# Patient Record
Sex: MALE | Race: WHITE | NOT HISPANIC OR LATINO | Employment: FULL TIME | URBAN - METROPOLITAN AREA
[De-identification: names, ages, dates, MRNs, and addresses within clinical notes are randomized per-mention and may not be internally consistent; named-entity substitution may affect disease eponyms.]

---

## 2024-03-14 ENCOUNTER — HOSPITAL ENCOUNTER (EMERGENCY)
Facility: HOSPITAL | Age: 49
Discharge: HOME | End: 2024-03-14
Attending: EMERGENCY MEDICINE
Payer: MEDICAID

## 2024-03-14 VITALS
DIASTOLIC BLOOD PRESSURE: 111 MMHG | BODY MASS INDEX: 23.86 KG/M2 | SYSTOLIC BLOOD PRESSURE: 140 MMHG | OXYGEN SATURATION: 99 % | HEART RATE: 89 BPM | WEIGHT: 180 LBS | TEMPERATURE: 98 F | RESPIRATION RATE: 16 BRPM | HEIGHT: 73 IN

## 2024-03-14 DIAGNOSIS — F19.10 POLYSUBSTANCE ABUSE: ICD-10-CM

## 2024-03-14 DIAGNOSIS — R45.851 SUICIDAL IDEATIONS: Primary | ICD-10-CM

## 2024-03-14 DIAGNOSIS — F19.10 SUBSTANCE ABUSE: ICD-10-CM

## 2024-03-14 LAB
ALBUMIN SERPL-MCNC: 4 G/DL (ref 3.5–5.2)
ALBUMIN/GLOB SERPL: 1.1 G/DL
ALP SERPL-CCNC: 126 U/L (ref 39–117)
ALT SERPL W P-5'-P-CCNC: 39 U/L (ref 1–41)
AMPHET+METHAMPHET UR QL: POSITIVE
AMPHETAMINES UR QL: POSITIVE
ANION GAP SERPL CALCULATED.3IONS-SCNC: 10 MMOL/L (ref 5–15)
AST SERPL-CCNC: 42 U/L (ref 1–40)
BARBITURATES UR QL SCN: NEGATIVE
BASOPHILS # BLD AUTO: 0.03 10*3/MM3 (ref 0–0.2)
BASOPHILS NFR BLD AUTO: 0.4 % (ref 0–1.5)
BENZODIAZ UR QL SCN: NEGATIVE
BILIRUB SERPL-MCNC: 0.7 MG/DL (ref 0–1.2)
BUN SERPL-MCNC: 9 MG/DL (ref 6–20)
BUN/CREAT SERPL: 12.5 (ref 7–25)
BUPRENORPHINE SERPL-MCNC: POSITIVE NG/ML
CALCIUM SPEC-SCNC: 9 MG/DL (ref 8.6–10.5)
CANNABINOIDS SERPL QL: NEGATIVE
CHLORIDE SERPL-SCNC: 101 MMOL/L (ref 98–107)
CO2 SERPL-SCNC: 29 MMOL/L (ref 22–29)
COCAINE UR QL: NEGATIVE
CREAT SERPL-MCNC: 0.72 MG/DL (ref 0.76–1.27)
DEPRECATED RDW RBC AUTO: 40.8 FL (ref 37–54)
EGFRCR SERPLBLD CKD-EPI 2021: 114.1 ML/MIN/1.73
EOSINOPHIL # BLD AUTO: 0.16 10*3/MM3 (ref 0–0.4)
EOSINOPHIL NFR BLD AUTO: 2.2 % (ref 0.3–6.2)
ERYTHROCYTE [DISTWIDTH] IN BLOOD BY AUTOMATED COUNT: 13.1 % (ref 12.3–15.4)
ETHANOL BLD-MCNC: <10 MG/DL (ref 0–10)
FENTANYL UR-MCNC: POSITIVE NG/ML
GLOBULIN UR ELPH-MCNC: 3.5 GM/DL
GLUCOSE SERPL-MCNC: 123 MG/DL (ref 65–99)
HCT VFR BLD AUTO: 42.3 % (ref 37.5–51)
HGB BLD-MCNC: 13.9 G/DL (ref 13–17.7)
IMM GRANULOCYTES # BLD AUTO: 0.01 10*3/MM3 (ref 0–0.05)
IMM GRANULOCYTES NFR BLD AUTO: 0.1 % (ref 0–0.5)
LYMPHOCYTES # BLD AUTO: 2.06 10*3/MM3 (ref 0.7–3.1)
LYMPHOCYTES NFR BLD AUTO: 28.3 % (ref 19.6–45.3)
MCH RBC QN AUTO: 28.4 PG (ref 26.6–33)
MCHC RBC AUTO-ENTMCNC: 32.9 G/DL (ref 31.5–35.7)
MCV RBC AUTO: 86.5 FL (ref 79–97)
METHADONE UR QL SCN: NEGATIVE
MONOCYTES # BLD AUTO: 0.89 10*3/MM3 (ref 0.1–0.9)
MONOCYTES NFR BLD AUTO: 12.2 % (ref 5–12)
NEUTROPHILS NFR BLD AUTO: 4.13 10*3/MM3 (ref 1.7–7)
NEUTROPHILS NFR BLD AUTO: 56.8 % (ref 42.7–76)
NRBC BLD AUTO-RTO: 0 /100 WBC (ref 0–0.2)
OPIATES UR QL: NEGATIVE
OXYCODONE UR QL SCN: NEGATIVE
PCP UR QL SCN: NEGATIVE
PLATELET # BLD AUTO: 277 10*3/MM3 (ref 140–450)
PMV BLD AUTO: 8.4 FL (ref 6–12)
POTASSIUM SERPL-SCNC: 4.3 MMOL/L (ref 3.5–5.2)
PROT SERPL-MCNC: 7.5 G/DL (ref 6–8.5)
RBC # BLD AUTO: 4.89 10*6/MM3 (ref 4.14–5.8)
SODIUM SERPL-SCNC: 140 MMOL/L (ref 136–145)
TRICYCLICS UR QL SCN: NEGATIVE
WBC NRBC COR # BLD AUTO: 7.28 10*3/MM3 (ref 3.4–10.8)

## 2024-03-14 PROCEDURE — 80053 COMPREHEN METABOLIC PANEL: CPT | Performed by: EMERGENCY MEDICINE

## 2024-03-14 PROCEDURE — 82077 ASSAY SPEC XCP UR&BREATH IA: CPT | Performed by: EMERGENCY MEDICINE

## 2024-03-14 PROCEDURE — 85025 COMPLETE CBC W/AUTO DIFF WBC: CPT | Performed by: EMERGENCY MEDICINE

## 2024-03-14 PROCEDURE — 96375 TX/PRO/DX INJ NEW DRUG ADDON: CPT

## 2024-03-14 PROCEDURE — 25010000002 MIDAZOLAM PER 1 MG: Performed by: EMERGENCY MEDICINE

## 2024-03-14 PROCEDURE — 96374 THER/PROPH/DIAG INJ IV PUSH: CPT

## 2024-03-14 PROCEDURE — 80307 DRUG TEST PRSMV CHEM ANLYZR: CPT | Performed by: EMERGENCY MEDICINE

## 2024-03-14 PROCEDURE — 25010000002 LORAZEPAM PER 2 MG: Performed by: EMERGENCY MEDICINE

## 2024-03-14 PROCEDURE — 99285 EMERGENCY DEPT VISIT HI MDM: CPT

## 2024-03-14 RX ORDER — MIDAZOLAM HYDROCHLORIDE 1 MG/ML
1 INJECTION INTRAMUSCULAR; INTRAVENOUS ONCE
Status: COMPLETED | OUTPATIENT
Start: 2024-03-14 | End: 2024-03-14

## 2024-03-14 RX ORDER — MIDAZOLAM HYDROCHLORIDE 1 MG/ML
2 INJECTION INTRAMUSCULAR; INTRAVENOUS ONCE
Status: DISCONTINUED | OUTPATIENT
Start: 2024-03-14 | End: 2024-03-14

## 2024-03-14 RX ORDER — LORAZEPAM 2 MG/ML
1 INJECTION INTRAMUSCULAR ONCE
Status: COMPLETED | OUTPATIENT
Start: 2024-03-14 | End: 2024-03-14

## 2024-03-14 RX ADMIN — LORAZEPAM 1 MG: 2 INJECTION INTRAMUSCULAR; INTRAVENOUS at 15:50

## 2024-03-14 RX ADMIN — MIDAZOLAM HYDROCHLORIDE 1 MG: 1 INJECTION, SOLUTION INTRAMUSCULAR; INTRAVENOUS at 14:20

## 2024-03-14 NOTE — ED PROVIDER NOTES
"Subjective   History of Present Illness  This patient is a very nice 48-year-old gentleman who comes in to the emergency department on his own volition having walked some distance.  He tells me \"I do not know what to do.\"  Initially triage note indicated the patient had no SI or HI, however one of the nurses here indicated that the patient did share that at least once here since arriving, the patient stated that he plan to hurt or kill himself secondary to \"having nobody left.\"  Patient tells me he has a history of heroin abuse but has been clean for several years.  He tells me he recently lost several family members including his mom.  He tells me his wife kicked him out.  He denies any homicidal ideations.  Denies any A, B, T, or O hallucinations.  Patient reports that he believes he has been \"high\" approximately 4-5 times over the last 8 months.  Denies any recent substance abuse.  Denies any alcohol.  Patient is pleasant here, oriented x 3, somewhat anxious and restless appearing but pleasant.  He does contract for safety.  When asked exactly why he came into the hospital today, he tells me \"I really do not know.\"  When asked him if he is sad or if he thinks about death, he tells me he is both sad and does think about death because he does not have anybody left.    I told the patient we would need to keep him here and that he was not free to go as I was concerned that he was a risk to himself and patient tells me that he understands.    Patient denies any chest pain, headache, vision changes, fever chills or cough.  Patient is resting comfortably and is unaccompanied.    Past medical history  Substance abuse/heroin.  Currently taking Suboxone.  Patient denies hypertension, dyslipidemia, CAD or CVA    Family history  Negative for CAD      Review of Systems   Constitutional: Negative.  Negative for chills, fatigue, fever and unexpected weight change.   HENT:  Negative for dental problem, ear pain, hearing loss and " sinus pressure.    Eyes:  Negative for pain and visual disturbance.   Respiratory:  Negative for chest tightness and shortness of breath.    Cardiovascular:  Negative for chest pain, palpitations and leg swelling.   Gastrointestinal:  Negative for blood in stool, diarrhea, nausea and vomiting.   Genitourinary:  Negative for difficulty urinating, dysuria, frequency, hematuria and urgency.   Musculoskeletal:  Negative for myalgias, neck pain and neck stiffness.   Neurological:  Negative for seizures, syncope, speech difficulty, light-headedness and headaches.   Psychiatric/Behavioral:  Positive for suicidal ideas. Negative for confusion. The patient is nervous/anxious.    All other systems reviewed and are negative.      No past medical history on file.    No Known Allergies    No past surgical history on file.    No family history on file.    Social History     Socioeconomic History    Marital status: Single           Objective   Physical Exam  Vitals and nursing note reviewed.   Constitutional:       General: He is not in acute distress.     Appearance: He is well-developed. He is not toxic-appearing.   HENT:      Head: Normocephalic and atraumatic.      Jaw: No trismus.      Right Ear: External ear normal.      Left Ear: External ear normal.      Nose: Nose normal.      Mouth/Throat:      Mouth: Mucous membranes are moist. Mucous membranes are not dry. No oral lesions.      Dentition: No dental abscesses.      Pharynx: Oropharynx is clear. No posterior oropharyngeal erythema or uvula swelling.      Tonsils: No tonsillar exudate or tonsillar abscesses.   Eyes:      General:         Right eye: No discharge.         Left eye: No discharge.      Extraocular Movements: Extraocular movements intact.      Conjunctiva/sclera: Conjunctivae normal.      Right eye: Right conjunctiva is not injected.      Left eye: Left conjunctiva is not injected.      Pupils: Pupils are equal, round, and reactive to light.   Neck:       Vascular: No JVD.      Trachea: No tracheal tenderness.   Cardiovascular:      Rate and Rhythm: Normal rate and regular rhythm.      Heart sounds: Normal heart sounds.      No friction rub. No gallop.   Pulmonary:      Effort: Pulmonary effort is normal.      Breath sounds: Normal breath sounds. No wheezing or rales.   Chest:      Chest wall: No tenderness.   Abdominal:      General: Bowel sounds are normal. There is no distension.      Palpations: Abdomen is soft. Abdomen is not rigid. There is no mass or pulsatile mass.      Tenderness: There is no abdominal tenderness. There is no guarding or rebound. Negative signs include McBurney's sign.      Comments: No signs of acute abdomen.  No pain at McBurney's point.  No pulsatile abdominal mass.   Musculoskeletal:         General: No tenderness or deformity. Normal range of motion.      Cervical back: Normal range of motion and neck supple. No rigidity. Normal range of motion.   Lymphadenopathy:      Cervical: No cervical adenopathy.   Skin:     General: Skin is warm and dry.      Findings: No erythema or rash.      Comments: No diaphoresis, lesions, nevi, petechia, purpura   Neurological:      Mental Status: He is alert and oriented to person, place, and time.      Cranial Nerves: No cranial nerve deficit.      Sensory: No sensory deficit.      Motor: No tremor or abnormal muscle tone.      Comments: 5/5 strength bilaterally with flexion and extension of fingers, wrist, elbows, knees and hips as well as plantar and dorsiflexion of the foot.   Psychiatric:         Attention and Perception: He is attentive.         Mood and Affect: Mood is anxious.         Speech: Speech normal.         Thought Content: Thought content normal. Thought content is not paranoid or delusional. Thought content includes suicidal ideation. Thought content does not include homicidal or suicidal plan.         Judgment: Judgment normal.      Comments: Patient sad and states that he sometimes  "thinks that he would be better off if he was not here.  No specific plan.  Does not appear to have access to a weapon.  Contract for safety.  No homicidal ideations.         Procedures           ED Course  ED Course as of 03/14/24 1548   Thu Mar 14, 2024   1417 I had a good conversation with the patient.  Although the patient is now stating that he is no longer suicidal, I am concerned about recent loss of family, desponded state, and statements such as \"I just want to be alive.\"  Patient is on a hold at this time.  UDS, alcohol, CBC and CMP ordered.  Will have the [facility] team see the patient once labs are back.  Patient does contract for safety.  He is very appreciative for care at this time he does not appear intoxicated. [ARAM]   1418 Patient denies homicidal ideations.  Denies any auditory, visual, tactile or olfactory hallucinations.  Impression will include suicidal ideations.  Substance abuse.  Final impression and plan following completion of workup. [ARAM]   1449 Labs are starting to result.  Specifically, CBC is back and is unremarkable.  White count is 7.28.  Hemoglobin and hematocrit 13.9 and 42.3 with a platelet count of 277. [ARAM]   1456 Labs are slowly starting to result and look pretty good at this point.  Specifically, CBC shows a white count of 7.28 with hemoglobin and hematocrit 13.9 and 42.3 with a platelet count of 277.  CMP does show elevated alkaline phosphatase, AST of 42, creatinine 0.72 and glucose of 123.  Alcohol level less than 10 and urine drug screen pending. [ARAM]   1535 Patient evidently is now refusing to give a urine study even though he earlier told me he was more than happy to do so.  I am going back to his room to see if he will comply.   coordinator being contacted now. [ARAM]   1543 Versed was given with mild decrease in anxiety but without great effect.  ED pharmacist is going to help us by obtaining 1 of Ativan which I think will help tremendously.  I was called back to the " "bedside as the patient was unwilling to give a urinalysis.  When I went back, I sat nicely next to the patient's bed and he could not have been nicer.  He once again contracted for safety, he apologized for being difficult and told me that he was able to give a urinalysis.  I told the patient that we wanted the  team to see him.  They have apparently 1 or 2 patients in front of him and he understands this.  I told him that if they clear him for discharge, would like colleagues will discharge the patient home.  If the patient is thought to be a threat to himself, patient would likely be in a situation where he either would need to be held against his resistance or he could go willingly for therapy.  Patient tells me he understands and does \"want to get help.\" [ARAM]   1544 Impression includes suicidal ideations, substance abuse.  Urinalysis pending.  Patient is alert and oriented x 3.  He is pleasant.  He does still seem slightly anxious the patient will be evaluated by  shortly. [ARAM]   1546 FORMALLY ADMITTING PT TO OBSERVATION STATUS, PENDING  EVAL AND ULTIMATE DISPOSITION BY THEM. [ARAM]      ED Course User Index  [ARAM] Carlos Pappas MD      Recent Results (from the past 24 hour(s))   Comprehensive Metabolic Panel    Collection Time: 03/14/24  2:22 PM    Specimen: Blood   Result Value Ref Range    Glucose 123 (H) 65 - 99 mg/dL    BUN 9 6 - 20 mg/dL    Creatinine 0.72 (L) 0.76 - 1.27 mg/dL    Sodium 140 136 - 145 mmol/L    Potassium 4.3 3.5 - 5.2 mmol/L    Chloride 101 98 - 107 mmol/L    CO2 29.0 22.0 - 29.0 mmol/L    Calcium 9.0 8.6 - 10.5 mg/dL    Total Protein 7.5 6.0 - 8.5 g/dL    Albumin 4.0 3.5 - 5.2 g/dL    ALT (SGPT) 39 1 - 41 U/L    AST (SGOT) 42 (H) 1 - 40 U/L    Alkaline Phosphatase 126 (H) 39 - 117 U/L    Total Bilirubin 0.7 0.0 - 1.2 mg/dL    Globulin 3.5 gm/dL    A/G Ratio 1.1 g/dL    BUN/Creatinine Ratio 12.5 7.0 - 25.0    Anion Gap 10.0 5.0 - 15.0 mmol/L    eGFR 114.1 >60.0 mL/min/1.73 " "  Ethanol    Collection Time: 03/14/24  2:22 PM    Specimen: Blood   Result Value Ref Range    Ethanol <10 0 - 10 mg/dL   CBC Auto Differential    Collection Time: 03/14/24  2:22 PM    Specimen: Blood   Result Value Ref Range    WBC 7.28 3.40 - 10.80 10*3/mm3    RBC 4.89 4.14 - 5.80 10*6/mm3    Hemoglobin 13.9 13.0 - 17.7 g/dL    Hematocrit 42.3 37.5 - 51.0 %    MCV 86.5 79.0 - 97.0 fL    MCH 28.4 26.6 - 33.0 pg    MCHC 32.9 31.5 - 35.7 g/dL    RDW 13.1 12.3 - 15.4 %    RDW-SD 40.8 37.0 - 54.0 fl    MPV 8.4 6.0 - 12.0 fL    Platelets 277 140 - 450 10*3/mm3    Neutrophil % 56.8 42.7 - 76.0 %    Lymphocyte % 28.3 19.6 - 45.3 %    Monocyte % 12.2 (H) 5.0 - 12.0 %    Eosinophil % 2.2 0.3 - 6.2 %    Basophil % 0.4 0.0 - 1.5 %    Immature Grans % 0.1 0.0 - 0.5 %    Neutrophils, Absolute 4.13 1.70 - 7.00 10*3/mm3    Lymphocytes, Absolute 2.06 0.70 - 3.10 10*3/mm3    Monocytes, Absolute 0.89 0.10 - 0.90 10*3/mm3    Eosinophils, Absolute 0.16 0.00 - 0.40 10*3/mm3    Basophils, Absolute 0.03 0.00 - 0.20 10*3/mm3    Immature Grans, Absolute 0.01 0.00 - 0.05 10*3/mm3    nRBC 0.0 0.0 - 0.2 /100 WBC     Note: In addition to lab results from this visit, the labs listed above may include labs taken at another facility or during a different encounter within the last 24 hours. Please correlate lab times with ED admission and discharge times for further clarification of the services performed during this visit.    No orders to display     Vitals:    03/14/24 1254   BP: (!) 140/111   BP Location: Right arm   Patient Position: Sitting   Pulse: 89   Resp: 16   Temp: 98 °F (36.7 °C)   TempSrc: Oral   SpO2: 99%   Weight: 81.6 kg (180 lb)   Height: 185.4 cm (73\")     Medications   LORazepam (ATIVAN) injection 1 mg (has no administration in time range)   midazolam (VERSED) injection 1 mg (1 mg Intravenous Given 3/14/24 1420)     ECG/EMG Results (last 24 hours)       ** No results found for the last 24 hours. **          No orders to display "                                                Medical Decision Making  Patient with clear SI, recent loss of family, history of substance abuse.  Patient does want help.  Will keep the patient here on a hold and have him evaluated.  Patient understands.  Labs ordered.  Patient oriented x 4.  Slightly anxious.    Problems Addressed:  Substance abuse: complicated acute illness or injury  Suicidal ideations: complicated acute illness or injury    Amount and/or Complexity of Data Reviewed  Labs: ordered. Decision-making details documented in ED Course.    Risk  Prescription drug management.        Final diagnoses:   Suicidal ideations   Substance abuse       ED Disposition  ED Disposition       ED Disposition   Intended Admit    Condition   --    Comment   SI / Hold               No follow-up provider specified.       Medication List      No changes were made to your prescriptions during this visit.            Carlos Pappas MD  03/14/24 1817

## 2024-03-14 NOTE — Clinical Note
[facility] EMERGENCY DEPARTMENT  [address]  [facility] [address]  Phone: [phone]    Shashank Alex was seen and treated in our emergency department on 3/14/2024.  He may return to work on 03/15/2024.         Thank you for choosing [facility].    Kandi Baum, RN

## 2024-03-15 NOTE — DISCHARGE INSTRUCTIONS
We have set up transportation to the [facility].  Please do everything you can to get into rehab and stop using recreational drugs.    If you have any thoughts of harming yourself or others please seek help immediately.  You can always go to an emergency department for reevaluation.

## 2024-03-15 NOTE — ED PROVIDER NOTES
Live Collazo MD  I took over care of this patient at roughly 1600.  The patient had been admitted to observation status.  We observed him for several hours and I performed evaluation and reevaluation on 2 separate occasions.  Most recently just prior to discharge I found him to be alert and oriented with clear speech.  See my ED course notes for further details.  I have formally discharged him from observation status which was initiated by Dr. Pappas.     Live Collazo MD  03/14/24 5819

## 2024-03-15 NOTE — CONSULTS
Shashank Isai  1975    This provider is at the Behavioral Health Clinical Decision Making Unit at [address] [facility], [address] using a secure Teams Video Visit. Patient is being seen remotely via telehealth at [address] [place] [address], [id number], and stated they are in a secure environment for this session. The patient's condition being diagnosed/treated is appropriate for telemedicine. The provider identified themselves and their credentials.   The patient, and/or patient's guardian, consent to be seen remotely, and when consent is given, they understand that the consent allows for patient identifiable information to be sent to a third party as needed.   They may refuse to be seen remotely at any time. The electronic data is encrypted, and password protected, and the patient and/or guardian has been advised of the potential risks to privacy not withstanding such measures.”         Preferred Pronouns: He/Him  Race/Ethnicity: White or   Martial Status: Single  Guardian Name/Contact/etc: Self  Pt Lives With:  Patient reports he is homeless at this time  Occupation: unemployed  Appearance: disheveled, unkempt     Time Called for Assessment: 15:33 ( Patient handed off from day shift therapist)   Assessment Start and End: 18:15-18:44      DATA:   Clinician received a call from [facility] staff for a behavioral health consult.  The patient is agreeable to speak with the behavioral health team.  Met with patient at bedside. Patient is under 1:1 security monitoring.  The attending treatment team is Megha Almanzar RN and Dr. Pappas and Dr. Collazo. , Provider.  Patient presents today with chief compliant of substance abuse.   Clinician completed assessment with patient and observations are documented as follows.    ASSESSMENT:    Clinician consulted with patient for mental status exam and assessment.  Clinical descriptors are documented as follows.  Clinician completed CSSRS with patient for  suicide risk assessment.  The results of patient’s CSSRS documented as follows.    Presenting Problems: Therapist spoke with patient reported that he came to the ED for due feeling down and depressed. Patient stated that he is homeless and wanted someone to talk to. Patient stated that he does not have any current mental health services. Patient denied having any suicidal thoughts, plans or intent to harm himself or others. Patient denied having any AVH. Patient stated that he is hungry and does not have anything and would like to go to sober living to have a place to stay, go to meetings and have help getting a job. Patient denied wanting to go to inpatient rehabilitation.    Current Stressors: Substance use/anxiety depression/homelessness.     Established Therapy, Medication Management or Other Mental Health Services: Patient does not have any services at this time.  Current Psychiatric Medications: Patient stated that he does take medications but has not been on the medications for 2 weeks. Med list per patient  ADHD ( patient could not remember name)  Remeron 65mg daily  Trazedone 25 mg daily for sleep  Wellbutrin 450mg  Daily  Prednisone 30mg daily for arthritis.      Mental Status Exam:  Behavior: Appropriate  Psychomotor Movement: Restless  Attention and Cooperation: Normal and Cooperative  Mood: appropriate to circumstances and Affect: Appropriate  Orientation: alert and oriented to person, place, and time   Thought Process: linear, logical, and goal directed  Thought Content: normal  Delusions:  None     Hallucinations: None and Not demonstrated today   Concentration: Normal  Suicidal Ideation: Absent  Homicidal Ideation: Absent  Hopelessness: Mild  Speech: Normal  Eye Contact: Poor  Insight: Fair  Judgement: Fair    Depression: 8   Anxiety: 9  Sleep: Poor   Appetite:  Patient stated that he does not eat much due to being homeless and not having a job,. Patient has had 4 turkey boxes since being at ED         Hx of Psychiatric or Detox Hospitalizations:  Yes, describe: Patient could not remember the last hospital stated that it was hwen he was 17 or 18 years old.   Most recent inpatient admission: Many years ago    Suicidal Ideation Assessment:    COLUMBIA-SUICIDE SEVERITY RATING SCALE  Psychiatric Inpatient Setting - Discharge Screener    Ask questions that are bold and underlined Discharge   Ask Questions 1 and 2 YES NO   Wish to be Dead:   Person endorses thoughts about a wish to be dead or not alive anymore, or wish to fall asleep and not wake up.  While you were here in the hospital, have you wished you were dead or wished you could go to sleep and not wake up?  X   Suicidal Thoughts:   General non-specific thoughts of wanting to end one's life/die by suicide, “I've thought about killing myself” without general thoughts of ways to kill oneself/associated methods, intent, or plan.   While you were here in the hospital, have you actually had thoughts about killing yourself?   X   If YES to 2, ask questions 3, 4, 5, and 6.  If NO to 2, go directly to question 6   3) Suicidal Thoughts with Method (without Specific Plan or Intent to Act):   Person endorses thoughts of suicide and has thought of a least one method during the assessment period. This is different than a specific plan with time, place or method details worked out. “I thought about taking an overdose but I never made a specific plan as to when where or how I would actually do it….and I would never go through with it.”   Have you been thinking about how you might kill yourself?   X   4) Suicidal Intent (without Specific Plan):   Active suicidal thoughts of killing oneself and patient reports having some intent to act on such thoughts, as opposed to “I have the thoughts but I definitely will not do anything about them.”   Have you had these thoughts and had some intention of acting on them or do you have some intention of acting on them after you leave  "the hospital?   X   5) Suicide Intent with Specific Plan:   Thoughts of killing oneself with details of plan fully or partially worked out and person has some intent to carry it out.   Have you started to work out or worked out the details of how to kill yourself either for while you were here in the hospital or for after you leave the hospital? Do you intend to carry out this plan?   X     6) Suicide Behavior    While you were here in the hospital, have you done anything, started to do anything, or prepared to do anything to end your life?    Examples: Took pills, cut yourself, tried to hang yourself, took out pills but didn't swallow any because you changed your mind or someone took them from you, collected pills, secured a means of obtaining a gun, gave away valuables, wrote a will or suicide note, etc.  X     Suicidal: Absent (If present, describe frequency of thoughts, patient's perception of impulse control, plan or behavior details, etc)  Previous Attempts:  Patient did not report any attempts  Most Recent Attempt: N/A    Psychosocial History  Family Hx of Mental Health/Substance Abuse: Patient reported yes, that his mother social anxiety, brother BPD that committed suicide. Uncle with substance use.  Patient Trauma/Abuse History: Patient stated\" my whole life\".     Does this require reporting: No  Patient Identified Support System (List family members, loved ones, guardians, friends, etc): None     Legal History / History of Violence: Denies significant history of legal issues.   Experience with Interpersonal Violence: No  History of Inappropriate Sexual Behavior: No  Current Medical Conditions or Biomedical Complications: No (If yes, explain/list)    Social Determinants of Health  Housing Instability and/or Utility Needs: Yes, describe: Patient is currently homeless  Food Insecurity: Yes, describe: Patient has been unable to eat ( While at ED had 4 turkey boxes)  Transportation Needs: Yes, describe: Patient " does not have any transportation.    Substance Use History  Active Use: Yes, describe: Patient reported that his last use of Heroin was 6 days ago. EtoH was <10mg/dl. UDS was positive for  Methamphetamine, Amphetamine, Fentanyl, Buprenorphine. Patent stated that his drug of choice was Heroin and started using it at age 37. Patient sated that he uses sporadically.       Does the patient have history or current MAT/MOUD: Patient is positive for buprenorphine, but did not report taking Suboxone   Withdrawal Symptoms: No  History of DT's: No  History of Seizures: No  Recovery Environment: None at this time      PLAN:  At this time, clinician recommends inpatient treatment for substance abuse treatment based upon the above assessment. Patient does not want to go to rehabilitation.   Clinician collaborated with the treatment team who agree to adopt the recommendations.  Clinician discussed recommendations with patient and/or patient support systems, and patient is not agreeable to the plan.  Patient requests to go a sober living facility.  Therapist recommended outpatient Substance abuse treatment with IOP, and patient continued to requests sober living.     Have the levels of care been discussed with the patient? Yes  Level of care recommendation: inpatient rehabilitation.   Is patient agreeable to treatment? No    Safety Planning:  Does the patient have access to weapons or firearms? No  Did clinician discuss securing weapons, firearms, sharps and/or medications with the patient? Yes  Safety Plan: Clinician verbally engaged in safety planning by assisting the patient in identifying risk factors that would indicate the need for higher level of care, such as thoughts to harm self or others and/or self-harming behavior(s). Safety plan of report to nearest hospital, calling local police or 911 if feeling unsafe, if having suicidal or homicidal thoughts, or if in emergent need of medications verbally reviewed with patient  during assessment and suicide prevention/crisis hotlines verbally reviewed with patient during assessment. Patient during assessment verbally agreed to safety plan. Reviewed resources of crisis hotlines or presenting to the nearest emergency department should symptoms worsen, or in any crisis/emergency. Patient agreeable and voiced understanding.     Care Coordination Timeline:  18:49-19:24  Therapist looked for sober living facilities with availability. Patient found [facility] in [place] and spoke with Gautam ( [phone]) Gautam stated that patient would need to be sober for 30 days before coming to [facility] and recommended inpatient treatment,   Therapist also reached [facility] in [place] at [phone] and spoke with manager. Patient would need to complete a detox program first and then call for an intake interview to see if patient met requirements and would be a good fit.     Therapist contacted ED RN Megha Almanzar and spoke with patient. Patient stated that he would go to [facility] for Detox and then contact [facility] for Sober Living. Therapist provided patient with phone number for [facility]. Patient continued to make statements that he did not want to go to inpatient rehab or just do outpatient IOP.     Megha Almanzar RN stated that patient would be provided transportation to [facility].       Patient does not present with criteria to warrant an involuntary psychiatric hold at this time as they are not endorsing active suicidal ideation with plan/intent, homicidal ideation with plan/intent or displaying symptoms of an acute psychotic episode without ability to appropriately plan for safety at a lesser restrictive level of care.  The patient identified proactive factors and is agreeable to establish/engage in outpatient behavioral health services.  Clinician provided resources for outpatient services and discussed the availability of emergency behavioral health services 24/7  through the [facility].  Clinician verbally engaged in safety planning by assisting the patient in identifying risk factors that would indicate the need for higher level of care, such as thoughts to harm self or others and/or self-harming behavior(s). Safety plan of report to nearest hospital, calling local police or 911 if feeling unsafe, if having suicidal or homicidal thoughts, or if in emergent need of medications verbally reviewed with patient during assessment and suicide prevention/crisis hotlines verbally reviewed with patient during assessment. Patient during assessment verbally agreed to safety plan. Reviewed resources of crisis hotlines or presenting to the nearest emergency department should symptoms worsen, or in any crisis/emergency. Patient agreeable and voiced understanding.       SIGNATURE  Parker Sam MA [facility]  03/14/2024      21:27 Therapist was called by RN At [facility]placed by [facility] that [facility] patient needed private insurance. Patient has Medicaid pending, Therapist told RN if patient is willing to go to [facility] or another facility for Detox, therapist would send referral. RN to call back if patient needs further placement for Detox.    21:54 Patient continues to refuse to go to inpatient rehabilitation and will take resources given and be discharged back to [facility] for their homeless shelter.

## 2024-09-18 ENCOUNTER — HOSPITAL ENCOUNTER (EMERGENCY)
Facility: HOSPITAL | Age: 49
Discharge: HOME | End: 2024-09-18
Attending: STUDENT IN AN ORGANIZED HEALTH CARE EDUCATION/TRAINING PROGRAM
Payer: MEDICAID

## 2024-09-18 VITALS
HEIGHT: 73 IN | WEIGHT: 185 LBS | OXYGEN SATURATION: 97 % | SYSTOLIC BLOOD PRESSURE: 162 MMHG | HEART RATE: 87 BPM | BODY MASS INDEX: 24.52 KG/M2 | DIASTOLIC BLOOD PRESSURE: 99 MMHG | TEMPERATURE: 98.4 F | RESPIRATION RATE: 22 BRPM

## 2024-09-18 DIAGNOSIS — M25.541 ARTHRALGIA OF BOTH HANDS: ICD-10-CM

## 2024-09-18 DIAGNOSIS — M25.521 ARTHRALGIA OF BOTH ELBOWS: ICD-10-CM

## 2024-09-18 DIAGNOSIS — M06.9 RHEUMATOID ARTHRITIS FLARE: Primary | ICD-10-CM

## 2024-09-18 DIAGNOSIS — M25.522 ARTHRALGIA OF BOTH ELBOWS: ICD-10-CM

## 2024-09-18 DIAGNOSIS — E83.39 HYPOPHOSPHATEMIA: ICD-10-CM

## 2024-09-18 DIAGNOSIS — F11.20 HEROIN DEPENDENCE: ICD-10-CM

## 2024-09-18 DIAGNOSIS — M25.542 ARTHRALGIA OF BOTH HANDS: ICD-10-CM

## 2024-09-18 LAB
ALBUMIN SERPL-MCNC: 3.2 G/DL (ref 3.5–5.2)
ALBUMIN/GLOB SERPL: 1.1 G/DL
ALP SERPL-CCNC: 121 U/L (ref 39–117)
ALT SERPL W P-5'-P-CCNC: 38 U/L (ref 1–41)
AMPHET+METHAMPHET UR QL: POSITIVE
AMPHETAMINES UR QL: POSITIVE
ANION GAP SERPL CALCULATED.3IONS-SCNC: 9 MMOL/L (ref 5–15)
APAP SERPL-MCNC: <5 MCG/ML (ref 0–30)
AST SERPL-CCNC: 48 U/L (ref 1–40)
BACTERIA UR QL AUTO: NORMAL /HPF
BARBITURATES UR QL SCN: NEGATIVE
BASOPHILS # BLD AUTO: 0.03 10*3/MM3 (ref 0–0.2)
BASOPHILS NFR BLD AUTO: 0.4 % (ref 0–1.5)
BENZODIAZ UR QL SCN: NEGATIVE
BILIRUB SERPL-MCNC: 0.4 MG/DL (ref 0–1.2)
BILIRUB UR QL STRIP: NEGATIVE
BUN SERPL-MCNC: 6 MG/DL (ref 6–20)
BUN/CREAT SERPL: 8.8 (ref 7–25)
BUPRENORPHINE SERPL-MCNC: NEGATIVE NG/ML
CALCIUM SPEC-SCNC: 8.3 MG/DL (ref 8.6–10.5)
CANNABINOIDS SERPL QL: NEGATIVE
CHLORIDE SERPL-SCNC: 100 MMOL/L (ref 98–107)
CLARITY UR: CLEAR
CO2 SERPL-SCNC: 27 MMOL/L (ref 22–29)
COCAINE UR QL: NEGATIVE
COLOR UR: YELLOW
CREAT SERPL-MCNC: 0.68 MG/DL (ref 0.76–1.27)
CRP SERPL-MCNC: 3.65 MG/DL (ref 0–0.5)
D-LACTATE SERPL-SCNC: 1.3 MMOL/L (ref 0.5–2)
DEPRECATED RDW RBC AUTO: 42.5 FL (ref 37–54)
EGFRCR SERPLBLD CKD-EPI 2021: 115.4 ML/MIN/1.73
EOSINOPHIL # BLD AUTO: 0.18 10*3/MM3 (ref 0–0.4)
EOSINOPHIL NFR BLD AUTO: 2.4 % (ref 0.3–6.2)
ERYTHROCYTE [DISTWIDTH] IN BLOOD BY AUTOMATED COUNT: 13.3 % (ref 12.3–15.4)
ERYTHROCYTE [SEDIMENTATION RATE] IN BLOOD: 19 MM/HR (ref 0–15)
ETHANOL BLD-MCNC: <10 MG/DL (ref 0–10)
FENTANYL UR-MCNC: POSITIVE NG/ML
GLOBULIN UR ELPH-MCNC: 3 GM/DL
GLUCOSE SERPL-MCNC: 145 MG/DL (ref 65–99)
GLUCOSE UR STRIP-MCNC: NEGATIVE MG/DL
HCT VFR BLD AUTO: 35.8 % (ref 37.5–51)
HGB BLD-MCNC: 12 G/DL (ref 13–17.7)
HGB UR QL STRIP.AUTO: ABNORMAL
HYALINE CASTS UR QL AUTO: NORMAL /LPF
IMM GRANULOCYTES # BLD AUTO: 0.01 10*3/MM3 (ref 0–0.05)
IMM GRANULOCYTES NFR BLD AUTO: 0.1 % (ref 0–0.5)
KETONES UR QL STRIP: NEGATIVE
LEUKOCYTE ESTERASE UR QL STRIP.AUTO: NEGATIVE
LYMPHOCYTES # BLD AUTO: 1.48 10*3/MM3 (ref 0.7–3.1)
LYMPHOCYTES NFR BLD AUTO: 19.6 % (ref 19.6–45.3)
MAGNESIUM SERPL-MCNC: 2.3 MG/DL (ref 1.6–2.6)
MCH RBC QN AUTO: 29.5 PG (ref 26.6–33)
MCHC RBC AUTO-ENTMCNC: 33.5 G/DL (ref 31.5–35.7)
MCV RBC AUTO: 88 FL (ref 79–97)
METHADONE UR QL SCN: NEGATIVE
MONOCYTES # BLD AUTO: 0.8 10*3/MM3 (ref 0.1–0.9)
MONOCYTES NFR BLD AUTO: 10.6 % (ref 5–12)
NEUTROPHILS NFR BLD AUTO: 5.06 10*3/MM3 (ref 1.7–7)
NEUTROPHILS NFR BLD AUTO: 66.9 % (ref 42.7–76)
NITRITE UR QL STRIP: NEGATIVE
NRBC BLD AUTO-RTO: 0 /100 WBC (ref 0–0.2)
OPIATES UR QL: NEGATIVE
OXYCODONE UR QL SCN: NEGATIVE
PCP UR QL SCN: NEGATIVE
PH UR STRIP.AUTO: 6.5 [PH] (ref 5–8)
PHOSPHATE SERPL-MCNC: 1.7 MG/DL (ref 2.5–4.5)
PLATELET # BLD AUTO: 320 10*3/MM3 (ref 140–450)
PMV BLD AUTO: 8.9 FL (ref 6–12)
POTASSIUM SERPL-SCNC: 3.1 MMOL/L (ref 3.5–5.2)
PROT SERPL-MCNC: 6.2 G/DL (ref 6–8.5)
PROT UR QL STRIP: NEGATIVE
RBC # BLD AUTO: 4.07 10*6/MM3 (ref 4.14–5.8)
RBC # UR STRIP: NORMAL /HPF
REF LAB TEST METHOD: NORMAL
SALICYLATES SERPL-MCNC: 0.7 MG/DL
SODIUM SERPL-SCNC: 136 MMOL/L (ref 136–145)
SP GR UR STRIP: 1.01 (ref 1–1.03)
SQUAMOUS #/AREA URNS HPF: NORMAL /HPF
TRICYCLICS UR QL SCN: NEGATIVE
UROBILINOGEN UR QL STRIP: ABNORMAL
WBC # UR STRIP: NORMAL /HPF
WBC NRBC COR # BLD AUTO: 7.56 10*3/MM3 (ref 3.4–10.8)

## 2024-09-18 PROCEDURE — 96375 TX/PRO/DX INJ NEW DRUG ADDON: CPT

## 2024-09-18 PROCEDURE — 96361 HYDRATE IV INFUSION ADD-ON: CPT

## 2024-09-18 PROCEDURE — 83605 ASSAY OF LACTIC ACID: CPT | Performed by: STUDENT IN AN ORGANIZED HEALTH CARE EDUCATION/TRAINING PROGRAM

## 2024-09-18 PROCEDURE — 86140 C-REACTIVE PROTEIN: CPT | Performed by: STUDENT IN AN ORGANIZED HEALTH CARE EDUCATION/TRAINING PROGRAM

## 2024-09-18 PROCEDURE — 87040 BLOOD CULTURE FOR BACTERIA: CPT | Performed by: STUDENT IN AN ORGANIZED HEALTH CARE EDUCATION/TRAINING PROGRAM

## 2024-09-18 PROCEDURE — 80143 DRUG ASSAY ACETAMINOPHEN: CPT | Performed by: STUDENT IN AN ORGANIZED HEALTH CARE EDUCATION/TRAINING PROGRAM

## 2024-09-18 PROCEDURE — 85025 COMPLETE CBC W/AUTO DIFF WBC: CPT | Performed by: STUDENT IN AN ORGANIZED HEALTH CARE EDUCATION/TRAINING PROGRAM

## 2024-09-18 PROCEDURE — 84100 ASSAY OF PHOSPHORUS: CPT | Performed by: STUDENT IN AN ORGANIZED HEALTH CARE EDUCATION/TRAINING PROGRAM

## 2024-09-18 PROCEDURE — 80179 DRUG ASSAY SALICYLATE: CPT | Performed by: STUDENT IN AN ORGANIZED HEALTH CARE EDUCATION/TRAINING PROGRAM

## 2024-09-18 PROCEDURE — 99283 EMERGENCY DEPT VISIT LOW MDM: CPT

## 2024-09-18 PROCEDURE — 25010000002 METHYLPREDNISOLONE PER 125 MG: Performed by: STUDENT IN AN ORGANIZED HEALTH CARE EDUCATION/TRAINING PROGRAM

## 2024-09-18 PROCEDURE — 96376 TX/PRO/DX INJ SAME DRUG ADON: CPT

## 2024-09-18 PROCEDURE — 93005 ELECTROCARDIOGRAM TRACING: CPT | Performed by: STUDENT IN AN ORGANIZED HEALTH CARE EDUCATION/TRAINING PROGRAM

## 2024-09-18 PROCEDURE — 81001 URINALYSIS AUTO W/SCOPE: CPT | Performed by: STUDENT IN AN ORGANIZED HEALTH CARE EDUCATION/TRAINING PROGRAM

## 2024-09-18 PROCEDURE — 80053 COMPREHEN METABOLIC PANEL: CPT | Performed by: STUDENT IN AN ORGANIZED HEALTH CARE EDUCATION/TRAINING PROGRAM

## 2024-09-18 PROCEDURE — 25010000002 KETOROLAC TROMETHAMINE PER 15 MG: Performed by: STUDENT IN AN ORGANIZED HEALTH CARE EDUCATION/TRAINING PROGRAM

## 2024-09-18 PROCEDURE — 96374 THER/PROPH/DIAG INJ IV PUSH: CPT

## 2024-09-18 PROCEDURE — 80307 DRUG TEST PRSMV CHEM ANLYZR: CPT | Performed by: STUDENT IN AN ORGANIZED HEALTH CARE EDUCATION/TRAINING PROGRAM

## 2024-09-18 PROCEDURE — 25810000003 SODIUM CHLORIDE 0.9 % SOLUTION: Performed by: STUDENT IN AN ORGANIZED HEALTH CARE EDUCATION/TRAINING PROGRAM

## 2024-09-18 PROCEDURE — 85652 RBC SED RATE AUTOMATED: CPT | Performed by: STUDENT IN AN ORGANIZED HEALTH CARE EDUCATION/TRAINING PROGRAM

## 2024-09-18 PROCEDURE — 82077 ASSAY SPEC XCP UR&BREATH IA: CPT | Performed by: STUDENT IN AN ORGANIZED HEALTH CARE EDUCATION/TRAINING PROGRAM

## 2024-09-18 PROCEDURE — 83735 ASSAY OF MAGNESIUM: CPT | Performed by: STUDENT IN AN ORGANIZED HEALTH CARE EDUCATION/TRAINING PROGRAM

## 2024-09-18 PROCEDURE — 36415 COLL VENOUS BLD VENIPUNCTURE: CPT

## 2024-09-18 RX ORDER — KETOROLAC TROMETHAMINE 15 MG/ML
15 INJECTION, SOLUTION INTRAMUSCULAR; INTRAVENOUS ONCE
Status: COMPLETED | OUTPATIENT
Start: 2024-09-18 | End: 2024-09-18

## 2024-09-18 RX ORDER — POTASSIUM CHLORIDE 750 MG/1
20 CAPSULE, EXTENDED RELEASE ORAL ONCE
Status: COMPLETED | OUTPATIENT
Start: 2024-09-18 | End: 2024-09-18

## 2024-09-18 RX ORDER — BUPRENORPHINE HYDROCHLORIDE AND NALOXONE HYDROCHLORIDE DIHYDRATE 8; 2 MG/1; MG/1
0.5 TABLET SUBLINGUAL ONCE
Status: COMPLETED | OUTPATIENT
Start: 2024-09-18 | End: 2024-09-18

## 2024-09-18 RX ORDER — PREDNISONE 20 MG/1
40 TABLET ORAL DAILY
Qty: 10 TABLET | Refills: 0 | Status: SHIPPED | OUTPATIENT
Start: 2024-09-18 | End: 2024-09-23

## 2024-09-18 RX ORDER — METHYLPREDNISOLONE SODIUM SUCCINATE 125 MG/2ML
125 INJECTION, POWDER, LYOPHILIZED, FOR SOLUTION INTRAMUSCULAR; INTRAVENOUS ONCE
Status: COMPLETED | OUTPATIENT
Start: 2024-09-18 | End: 2024-09-18

## 2024-09-18 RX ADMIN — METHYLPREDNISOLONE SODIUM SUCCINATE 125 MG: 125 INJECTION INTRAMUSCULAR; INTRAVENOUS at 08:01

## 2024-09-18 RX ADMIN — KETOROLAC TROMETHAMINE 15 MG: 15 INJECTION, SOLUTION INTRAMUSCULAR; INTRAVENOUS at 08:01

## 2024-09-18 RX ADMIN — POTASSIUM & SODIUM PHOSPHATES POWDER PACK 280-160-250 MG 2 PACKET: 280-160-250 PACK at 09:10

## 2024-09-18 RX ADMIN — POTASSIUM CHLORIDE 20 MEQ: 750 CAPSULE, EXTENDED RELEASE ORAL at 09:10

## 2024-09-18 RX ADMIN — SODIUM CHLORIDE 1000 ML: 9 INJECTION, SOLUTION INTRAVENOUS at 08:02

## 2024-09-18 RX ADMIN — KETOROLAC TROMETHAMINE 15 MG: 15 INJECTION, SOLUTION INTRAMUSCULAR; INTRAVENOUS at 11:13

## 2024-09-18 RX ADMIN — BUPRENORPHINE AND NALOXONE 0.5 TABLET: 8; 2 TABLET SUBLINGUAL at 08:01

## 2024-09-21 LAB
QT INTERVAL: 428 MS
QTC INTERVAL: 477 MS

## 2024-09-23 LAB
BACTERIA SPEC AEROBE CULT: NORMAL
BACTERIA SPEC AEROBE CULT: NORMAL